# Patient Record
Sex: FEMALE | Race: WHITE | NOT HISPANIC OR LATINO | Employment: OTHER | URBAN - METROPOLITAN AREA
[De-identification: names, ages, dates, MRNs, and addresses within clinical notes are randomized per-mention and may not be internally consistent; named-entity substitution may affect disease eponyms.]

---

## 2019-02-13 ENCOUNTER — TRANSFERRED RECORDS (OUTPATIENT)
Dept: HEALTH INFORMATION MANAGEMENT | Facility: CLINIC | Age: 59
End: 2019-02-13

## 2020-08-10 ENCOUNTER — TRANSFERRED RECORDS (OUTPATIENT)
Dept: HEALTH INFORMATION MANAGEMENT | Facility: CLINIC | Age: 60
End: 2020-08-10

## 2020-10-01 ENCOUNTER — VIRTUAL VISIT (OUTPATIENT)
Dept: DERMATOLOGY | Facility: CLINIC | Age: 60
End: 2020-10-01
Payer: COMMERCIAL

## 2020-10-01 DIAGNOSIS — L81.9 DYSPIGMENTATION: Primary | ICD-10-CM

## 2020-10-01 PROCEDURE — 99207 PR NO CHARGE LOS: CPT | Mod: 95 | Performed by: DERMATOLOGY

## 2020-10-01 ASSESSMENT — PAIN SCALES - GENERAL: PAINLEVEL: NO PAIN (0)

## 2020-10-01 NOTE — PATIENT INSTRUCTIONS
[facility] Dermatology Visit    Thank you for allowing us to participate in your care. Your findings, instructions and follow-up plan are as follows:    In person visits to look at skin lightness distribution and use black light  Get records from [place]    When should I call my doctor?    If you are worsening or not improving, please, contact us or seek urgent care as noted below.     Who should I call with questions (adults)?    [facility] (adult and pediatric): [phone]     [facility] (adult): [phone]    For urgent needs outside of business hours call the [facility] [place] at [phone] and ask for the dermatology resident on call    If this is a medical emergency and you are unable to reach an ER, Call [phone]      Who should I call with questions (pediatric)?  [facility]- Pediatric Dermatology  Dr. Jesenia Christensen, Dr. Kassandra Valenzuela, Dr. Brook Heaton, Pema Suh, PA  Dr. Flores Caraballo, Dr. Breonna Agustin & Dr. Efren Watkins  Non Urgent  Nurse Triage Line; [phone]- Lissy and Rupinder RN Care Coordinators   Jannet (/Complex ) [phone]    If you need a prescription refill, please contact your pharmacy. Refills are approved or denied by our Physicians during normal business hours, Monday through Fridays  Per office policy, refills will not be granted if you have not been seen within the past year (or sooner depending on your child's condition)    Scheduling Information:  Pediatric Appointment Scheduling and Call Center [phone]  Radiology Scheduling- [phone]  Sedation Unit Scheduling- [phone]  [place] Scheduling- General [phone]; Pediatric Dermatology [phone]  Main  Services: [phone]  Iraqi: [phone]  [place] citizen: [phone]  Hmong/Khmer/Vietnamese: [phone]  Preadmission Nursing Department Fax Number: [phone] (Fax all pre-operative paperwork to this number)    For urgent matters arising during evenings, weekends, or holidays that cannot wait for normal business hours please call [phone] and ask for the Dermatology Resident On-Call to be paged.

## 2020-10-01 NOTE — Clinical Note
Michael barbour,  I need two things for this patient.  1. Call to [place] dermatologist and get records sent over  2. Schedule in person appointment so I can evaluate her skin with a black light for vitiligo (schedule 2 weeks or more so we can be sure I have records by then).    Thanks,  LF

## 2020-10-01 NOTE — PROGRESS NOTES
[facility] Dermatology Record:  Store and Forward and Telephone [phone]       Dermatology Problem List:  1. Dyspigmentation on the extremities  -Diagnosed as vitiligo at [place] dermatologist after biopsy was taken  -Plan: In person evaluation to use wood's lamp, records requested for outside provider    Encounter Date: Oct 1, 2020    CC:   Chief Complaint   Patient presents with     Derm Problem     vitiligo f/u      New Patient       History of Present Illness:  Amanda Turner is a 60 year old female who presents for light spots on the skin.    She notes these appeared suddenly on her shoulders, arms, and legs 4 months ago. She denies spots on the knuckles or extensor knees. She saw someone at [place] in Dermatology. First she had a virtual visit, then she had an in person visit where a single skin biopsy was taken. She was told the pathologist went back and forth but eventually stated biopsy was most consistent with vitiligo. She was treated with clobetasol cream which she thinks has helped after using in the last month.    Of note, Clari denies any skin rash proceeding light spots. She remembers that they seemed to occur after she uses an off brand spray sunscreen.     ROS: Patient is generally feeling well today    Physical Examination:  General: Well-appearing female, appropriately-developed individual.  Skin: Focused examination including one forearm, both anterior shins, one posterior shoulder was performed.   -Numerous solar lentigines intermixed with ill defined hypopigmented to depigmented patches with irregular borders. No follicular repigmentation within any.    Labs:  None    Past Medical History:     No past medical history on file.  Past Surgical History:   Procedure Laterality Date     COLONOSCOPY  1/13/2011    COLONOSCOPY performed by KAROLINE YODER at [facility]       Social History:  Patient reports that she has never smoked. She has never used smokeless tobacco.    Family History:  Not  assessed.    Medications:  Current Outpatient Medications   Medication     Calcium 600+D 600-200 MG-UNIT TABS     chlorthalidone (HYGROTEN) 25 MG tablet     Multiple Vitamins-Minerals (MULTI COMPLETE/IRON) TABS     No current facility-administered medications for this visit.           Allergies   Allergen Reactions     Codeine Shortness Of Breath     Nkda [No Known Drug Allergies]            Impression and Recommendations (Patient Counseled on the Following):  1. 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 The triggering with use of sunscreen favors PIH or less likely chemical vitiligo. I do not think vilitigo is most likely, but to rule out, would need to see in person and evaluate with a wood's lamp. Biopsies of vitiligo can be tricky and require normal skin biopsy for comparison, so I would not be surprised if a single skin biopsy was misdiagnosed as vitiligo without having the comparison. Explained this all to patient who not understanding.    I will not charge for this visit as I was unable to fully evaluate or offer full recommendations with a virtual visit.    Follow-up:   Next available in person visit     Staff only    Marguerite Oneal MD    Department of Dermatology  [facility] [place] [facility]: Phone: [phone], Fax:[phone]  [facility]: Phone: [phone], Fax: [phone]    _____________________________________________________________________________    Teledermatology information:  - Location of patient: [place]  - Patient presented as: self referral  - Location of teledermatologist:  ([facility] )  - Reason teledermatology is appropriate:  of National Emergency Regarding Coronavirus disease (COVID 19) Outbreak  - Image quality and interpretability: acceptable  - Physician has received verbal consent for a  "Video/Photos Visit from the patient? Yes  - In-person dermatology visit recommendation: yes - for physician visit  - Date of images: 9/27/20  - Service start time: 1:54  - Service end time: 2:05  - Date of report: 10/1/2020       Teledermatology Nurse Call for NEW Patients (not seen in last 3 years):     The patient was contacted by phone and we reviewed they have a visit in teledermatology upcoming with an MD or DANIEL;  Importantly, \"a teledermatology visit may not be as thorough as an in-person visit, and the quality of the photograph and/or video sent may not be of the same quality as that taken by the dermatology clinic.\"     This video visit will be conducted via a call between you and your physician/provider via [employer]. We have found that certain health care needs can be provided without the need for an in-person physical exam.  This service lets us provide the care you need with a video conversation.  If a prescription is necessary we can send it directly to your pharmacy.  If lab work is needed we can place an order for that and you can then stop by our lab to have the test done at a later time.If during the course of the call the physician/provider feels a video visit is not appropriate, you will not be charged for this service.Visits are billed at different rates depending on your insurance coverage. Please reach out to your insurance provider with any questions.    The patient will also send photographs via [employer] for review. Instructions for photography are/were sent to the patient via [employer] messaging.       The patient verified the location of the photo/video visit to be [place].(The physician must be notified by nurse if the patient is not in the state of [place] during the encounter)    The patient denied skin pain, fever, mucosal symptoms(lesions, blisters, sores in the mouth, nose, eyes, or genitals)  IF PATIENT ENDORSES ANY OF THESE STOP AND PAGE ON CALL ATTENDING    Additional notes:  Patient " summary of issue: Vitiligo   Location of problem on body: All over body   How long has area/symptoms been present: 4 months now   What makes it better?: N/A   What makes it worse?:N/A   Other symptoms include the following: No symptoms to the skin.   Which medications have been tried, for how long, and did they make it better or worse (ex. Triamcinolone, used twice daily for 2 weeks, not improved): Past tx's - Clobetasol Propionate 0.5% cream applying to arms and legs BID and then decreasing it to every other day for three weeks. She voices improvement comparing photos taken now compared to initial photos taken at the [facility].  No current tx's.   The patient has seen a dermatologist in [place].  The patient has no past medical history of skin cancer  ROS: The patient is generally feeling well.

## 2020-10-01 NOTE — LETTER
10/1/2020         RE: Amanda Turner  [address] [place] [address]        Dear Colleague,    Thank you for referring your patient, Amanda Turner, to the [facility]. Please see a copy of my visit note below.    [facility] Dermatology Record:  Store and Forward and Telephone [phone]       Dermatology Problem List:  1. Dyspigmentation on the extremities  -Diagnosed as vitiligo at [place] dermatologist after biopsy was taken  -Plan: In person evaluation to use wood's lamp, records requested for outside provider    Encounter Date: Oct 1, 2020    CC:   Chief Complaint   Patient presents with     Derm Problem     vitiligo f/u      New Patient       History of Present Illness:  Amanda Turner is a 60 year old female who presents for light spots on the skin.    She notes these appeared suddenly on her shoulders, arms, and legs 4 months ago. She denies spots on the knuckles or extensor knees. She saw someone at [place] in Dermatology. First she had a virtual visit, then she had an in person visit where a single skin biopsy was taken. She was told the pathologist went back and forth but eventually stated biopsy was most consistent with vitiligo. She was treated with clobetasol cream which she thinks has helped after using in the last month.    Of note, Clari denies any skin rash proceeding light spots. She remembers that they seemed to occur after she uses an off brand spray sunscreen.     ROS: Patient is generally feeling well today    Physical Examination:  General: Well-appearing female, appropriately-developed individual.  Skin: Focused examination including one forearm, both anterior shins, one posterior shoulder was performed.   -Numerous solar lentigines intermixed with ill defined hypopigmented to depigmented patches with irregular borders. No follicular repigmentation within any.    Labs:  None    Past Medical History:     No past medical history on file.  Past Surgical  History:   Procedure Laterality Date     COLONOSCOPY  1/13/2011    COLONOSCOPY performed by KAROLINE YODER at [facility]       Social History:  Patient reports that she has never smoked. She has never used smokeless tobacco.    Family History:  Not assessed.    Medications:  Current Outpatient Medications   Medication     Calcium 600+D 600-200 MG-UNIT TABS     chlorthalidone (HYGROTEN) 25 MG tablet     Multiple Vitamins-Minerals (MULTI COMPLETE/IRON) TABS     No current facility-administered medications for this visit.           Allergies   Allergen Reactions     Codeine Shortness Of Breath     Nkda [No Known Drug Allergies]            Impression and Recommendations (Patient Counseled on the Following):  1. 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 The triggering with use of sunscreen favors PIH or less likely chemical vitiligo. I do not think vilitigo is most likely, but to rule out, would need to see in person and evaluate with a wood's lamp. Biopsies of vitiligo can be tricky and require normal skin biopsy for comparison, so I would not be surprised if a single skin biopsy was misdiagnosed as vitiligo without having the comparison. Explained this all to patient who not understanding.    I will not charge for this visit as I was unable to fully evaluate or offer full recommendations with a virtual visit.    Follow-up:   Next available in person visit     Staff only    Marguerite Oneal MD    Department of Dermatology  [facility]: Phone: [phone], Fax:[phone]  [facility]: Phone: [phone], Fax: [phone]    _____________________________________________________________________________    Teledermatology information:  - Location of patient: [place]  - Patient presented as: self referral  - Location of teledermatologist:  ([facility] [place] [place] )  - Reason teledermatology is appropriate:  of National Emergency Regarding Coronavirus disease (COVID 19) Outbreak  - Image quality and interpretability: acceptable  - Physician has received verbal consent for a Video/Photos Visit from the patient? Yes  - In-person dermatology visit recommendation: yes - for physician visit  - Date of images: 9/27/20  - Service start time: 1:54  - Service end time: 2:05  - Date of report: 10/1/2020       Teledermatology Nurse Call for NEW Patients (not seen in last 3 years):     The patient was contacted by phone and we reviewed they have a visit in teledermatology upcoming with an MD or PAASHLEY;  Importantly, \"a teledermatology visit may not be as thorough as an in-person visit, and the quality of the photograph and/or video sent may not be of the same quality as that taken by the dermatology clinic.\"     This video visit will be conducted via a call between you and your physician/provider via [employer]. We have found that certain health care needs can be provided without the need for an in-person physical exam.  This service lets us provide the care you need with a video conversation.  If a prescription is necessary we can send it directly to your pharmacy.  If lab work is needed we can place an order for that and you can then stop by our lab to have the test done at a later time.If during the course of the call the physician/provider feels a video visit is not appropriate, you will not be charged for this service.Visits are billed at different rates depending on your insurance coverage. Please reach out to your insurance provider with any questions.    The patient will also send photographs via [employer] for review. Instructions for photography are/were sent to the patient via [employer] messaging.       The patient verified the location of the photo/video visit to be [place].(The physician must be notified by nurse if the patient is not in the state of [place] " during the encounter)    The patient denied skin pain, fever, mucosal symptoms(lesions, blisters, sores in the mouth, nose, eyes, or genitals)  IF PATIENT ENDORSES ANY OF THESE STOP AND PAGE ON CALL ATTENDING    Additional notes:  Patient summary of issue: Vitiligo   Location of problem on body: All over body   How long has area/symptoms been present: 4 months now   What makes it better?: N/A   What makes it worse?:N/A   Other symptoms include the following: No symptoms to the skin.   Which medications have been tried, for how long, and did they make it better or worse (ex. Triamcinolone, used twice daily for 2 weeks, not improved): Past tx's - Clobetasol Propionate 0.5% cream applying to arms and legs BID and then decreasing it to every other day for three weeks. She voices improvement comparing photos taken now compared to initial photos taken at the [facility].  No current tx's.   The patient has seen a dermatologist in [place].  The patient has no past medical history of skin cancer  ROS: The patient is generally feeling well.                         Again, thank you for allowing me to participate in the care of your patient.        Sincerely,        Marguerite Oneal MD

## 2020-10-02 ENCOUNTER — MYC MEDICAL ADVICE (OUTPATIENT)
Dept: DERMATOLOGY | Facility: CLINIC | Age: 60
End: 2020-10-02

## 2020-10-02 ENCOUNTER — TELEPHONE (OUTPATIENT)
Dept: DERMATOLOGY | Facility: CLINIC | Age: 60
End: 2020-10-02

## 2020-10-02 NOTE — TELEPHONE ENCOUNTER
Marguerite Oneal MD sent to [facility] Dermatology Adult [place]             Michael barbour,   I need two things for this patient.   1. Call to [place] dermatologist and get records sent over   2. Schedule in person appointment so I can evaluate her skin with a black light for vitiligo (schedule 2 weeks or more so we can be sure I have records by then).     Thanks,   LF      [email or web address] message sent to patient to confirm which dermatology clinic she was seen at.  Mackenzie Loving RN

## 2020-10-05 NOTE — TELEPHONE ENCOUNTER
I left a voicemail message at [facility] in [place] requesting the records be faxed to [phone].    Follow up appointment scheduled.  Patient notified via [employer].    Will monitor chart to ensure we receive records prior to appointment.  Mackenzie Loving RN

## 2020-10-29 ENCOUNTER — OFFICE VISIT (OUTPATIENT)
Dept: DERMATOLOGY | Facility: CLINIC | Age: 60
End: 2020-10-29
Payer: COMMERCIAL

## 2020-10-29 DIAGNOSIS — L80 VITILIGO: Primary | ICD-10-CM

## 2020-10-29 DIAGNOSIS — L82.1 SEBORRHEIC KERATOSIS: ICD-10-CM

## 2020-10-29 DIAGNOSIS — D18.01 CHERRY ANGIOMA: ICD-10-CM

## 2020-10-29 DIAGNOSIS — L81.4 SOLAR LENTIGO: ICD-10-CM

## 2020-10-29 DIAGNOSIS — L57.8 SUN-DAMAGED SKIN: ICD-10-CM

## 2020-10-29 DIAGNOSIS — D23.71 DERMATOFIBROMA OF RIGHT LOWER EXTREMITY: ICD-10-CM

## 2020-10-29 PROCEDURE — 99214 OFFICE O/P EST MOD 30 MIN: CPT | Performed by: DERMATOLOGY

## 2020-10-29 RX ORDER — CLOBETASOL PROPIONATE 0.5 MG/G
OINTMENT TOPICAL
Qty: 60 G | Refills: 1 | Status: CANCELLED | OUTPATIENT
Start: 2020-10-29

## 2020-10-29 RX ORDER — CLOBETASOL PROPIONATE 0.5 MG/G
OINTMENT TOPICAL
COMMUNITY
Start: 2020-07-11 | End: 2020-10-29 | Stop reason: ALTCHOICE

## 2020-10-29 RX ORDER — TACROLIMUS 1 MG/G
OINTMENT TOPICAL
Qty: 100 G | Refills: 11 | Status: SHIPPED | OUTPATIENT
Start: 2020-10-29 | End: 2021-01-26

## 2020-10-29 ASSESSMENT — PAIN SCALES - GENERAL: PAINLEVEL: NO PAIN (0)

## 2020-10-29 NOTE — PROGRESS NOTES
[facility] Dermatology Note    Dermatology Problem List:  1. Vitligo  -Diagnosed as vitiligo at [place] dermatologist after biopsy was taken (biopsy showed complete loss of melanocytes)  -Treated with clobetasol 0.05% ointment with subjective improvement per patient  -Current treatment: protopic 0.1% ointment BID  -TSH ordered  2. SKs  - SK on the left medial mid back, biopsy 2/13/19     Encounter Date: Oct 29, 2020    CC:  Chief Complaint   Patient presents with     Derm Problem     No personal hx of NMSC, unsure of family hx of NMSC, patient has had AKs removed, vitiligo - came about early this Spring, not immunosuppressed       History of Present Illness:  Ms. Amanda Turner is a 60 year old female who presents for dyspigmentation.    She was seen for a virtual visit on 10/1/20 and decision was made that in person visit was needed. Virtual visit was no charged. Lesions were not classic for vitiligo (which is what was diagnosed at previous dermatologist), and exam with a wood's lamp was indicated.     Since virtual visit, biopsy report was received. It showed complete loss of melanocytes consistent with vitiligo. Patient notes no specific concerns on her skin outside of loss of pigment on the arms and legs and back. She has not noticed elsewhere. She does think clobetasol helped. She has no history of autoimmune disease including thyroid disease.    No other concerns addressed today.    Past Medical History:   Patient Active Problem List   Diagnosis     Allergic rhinitis     Hemorrhoids     Vitiligo     History reviewed. No pertinent past medical history.  Past Surgical History:   Procedure Laterality Date     COLONOSCOPY  1/13/2011    COLONOSCOPY performed by KAROLINE YODER at [facility]       Social History:  Patient reports that she has never smoked. She has never used smokeless tobacco.Lives in [place], [place].    Family History:  History reviewed. No pertinent family  history.    Medications:  Current Outpatient Medications   Medication Sig Dispense Refill     Calcium 600+D 600-200 MG-UNIT TABS Take  by mouth.       chlorthalidone (HYGROTEN) 25 MG tablet Take 25 mg by mouth daily.       Multiple Vitamins-Minerals (MULTI COMPLETE/IRON) TABS Take  by mouth.       tacrolimus (PROTOPIC) 0.1 % external ointment Apply thin layer to vitiligo on arms and legs once to twice daily. 100 g 11     clobetasol (TEMOVATE) 0.05 % external ointment Apply a thin layer topically 2 times every day to the affected area(s)         Allergies   Allergen Reactions     Codeine Shortness Of Breath     Nkda [No Known Drug Allergies]        Review of Systems:  -Constitutional: Patient is otherwise feeling well, in usual state of health.   -Skin: As above in HPI. No additional skin concerns.    Physical exam:  Vitals: There were no vitals taken for this visit.  GEN: This is a well developed, well-nourished female in no acute distress, in a pleasant mood.    SKIN: Total skin excluding the undergarment areas was performed. The exam included the head/face, neck, both arms, chest, back, abdomen, both legs, digits and/or nails. Declined genital concerns.  - Stanley Type II  - Scattered brown macules on sun exposed areas.  - There is a large patch of vitiligo on the central upper back, scattered macules and patches of vitiligo along the forearms and bilateral shins. All these areas do fluoresce with wood's lamp examination. There is subtle vitiligo around the eyelids and mouth that is not clinically apparent and only seen with wood's lamp.  - There are dome shaped bright red papules on the trunk.   - Multiple regular brown pigmented macules and papules are identified on the body. About 50 nevi in all. None are atypical.  - There are waxy stuck on tan to brown papules on the left mid back.  - There is a firm tan/flesh colored papule that dimples with lateral pressure on the right distal shin.  - No other lesions  of concern on areas examined.     Impression/Plan:    1. Vitiligo: While not classic clinically, biopsy and wood's lamp exam confirms the diagnosis. Patient does note improvement with clobetasol but interestingly she has almost no follicular repigmentation at this time. She notes she prefers topicals, does not want light treatments at present. We will switch her to a nonsteroidal for safety - protopic 0.1% ointment prescribed to be applied once or twice daily. Will recheck in 3 months to see how she is doing. TBSE did not reveal any concerning lesions for melanoma induced vitiligo today. I have ordered a TSH due to increased thyroid disease seen in vitiligo patients. Patient will get this drawn next month when she goes for her physical.    2. Sun damaged skin with solar lentigines  - Recommend sunscreens SPF #30 or greater, protective clothing and avoidance of tanning beds.    3. Benign findings including: seborrheic keratoses, cherry angioma, dermatofibroma  - No further intervention required. Patient to report changes.   - Patient reassured of the benign nature of these lesions.    4. Multiple clinically benign nevi  - No further intervention required. Patient to report changes.   - Patient reassured of the benign nature of these lesions.    Follow-up in 3 months for check of vitiligo, earlier for new or changing lesions.     Staff Involved:  Staff only    Marguerite Oneal MD    Department of Dermatology  [facility]: Phone: [phone], Fax:[phone]  [facility]: Phone: [phone], Fax: [phone]

## 2020-10-29 NOTE — LETTER
10/29/2020         RE: Amanda Turner  [address] [facility] [address]        Dear Colleague,    Thank you for referring your patient, Amanda Turner, to the [facility]. Please see a copy of my visit note below.    [facility] Dermatology Note    Dermatology Problem List:  1. Vitligo  -Diagnosed as vitiligo at [place] dermatologist after biopsy was taken (biopsy showed complete loss of melanocytes)  -Treated with clobetasol 0.05% ointment with subjective improvement per patient  -Current treatment: protopic 0.1% ointment BID  -TSH ordered  2. SKs  - SK on the left medial mid back, biopsy 2/13/19     Encounter Date: Oct 29, 2020    CC:  Chief Complaint   Patient presents with     Derm Problem     No personal hx of NMSC, unsure of family hx of NMSC, patient has had AKs removed, vitiligo - came about early this Spring, not immunosuppressed       History of Present Illness:  Ms. Amanda Turner is a 60 year old female who presents for dyspigmentation.    She was seen for a virtual visit on 10/1/20 and decision was made that in person visit was needed. Virtual visit was no charged. Lesions were not classic for vitiligo (which is what was diagnosed at previous dermatologist), and exam with a wood's lamp was indicated.     Since virtual visit, biopsy report was received. It showed complete loss of melanocytes consistent with vitiligo. Patient notes no specific concerns on her skin outside of loss of pigment on the arms and legs and back. She has not noticed elsewhere. She does think clobetasol helped. She has no history of autoimmune disease including thyroid disease.    No other concerns addressed today.    Past Medical History:   Patient Active Problem List   Diagnosis     Allergic rhinitis     Hemorrhoids     Vitiligo     History reviewed. No pertinent past medical history.  Past Surgical History:   Procedure Laterality Date     COLONOSCOPY  1/13/2011     COLONOSCOPY performed by KAROLINE YODER at [facility]       Social History:  Patient reports that she has never smoked. She has never used smokeless tobacco.Lives in [place], [place].    Family History:  History reviewed. No pertinent family history.    Medications:  Current Outpatient Medications   Medication Sig Dispense Refill     Calcium 600+D 600-200 MG-UNIT TABS Take  by mouth.       chlorthalidone (HYGROTEN) 25 MG tablet Take 25 mg by mouth daily.       Multiple Vitamins-Minerals (MULTI COMPLETE/IRON) TABS Take  by mouth.       tacrolimus (PROTOPIC) 0.1 % external ointment Apply thin layer to vitiligo on arms and legs once to twice daily. 100 g 11     clobetasol (TEMOVATE) 0.05 % external ointment Apply a thin layer topically 2 times every day to the affected area(s)         Allergies   Allergen Reactions     Codeine Shortness Of Breath     Nkda [No Known Drug Allergies]        Review of Systems:  -Constitutional: Patient is otherwise feeling well, in usual state of health.   -Skin: As above in HPI. No additional skin concerns.    Physical exam:  Vitals: There were no vitals taken for this visit.  GEN: This is a well developed, well-nourished female in no acute distress, in a pleasant mood.    SKIN: Total skin excluding the undergarment areas was performed. The exam included the head/face, neck, both arms, chest, back, abdomen, both legs, digits and/or nails. Declined genital concerns.  - Stanley Type II  - Scattered brown macules on sun exposed areas.  - There is a large patch of vitiligo on the central upper back, scattered macules and patches of vitiligo along the forearms and bilateral shins. All these areas do fluoresce with wood's lamp examination. There is subtle vitiligo around the eyelids and mouth that is not clinically apparent and only seen with wood's lamp.  - There are dome shaped bright red papules on the trunk.   - Multiple regular brown pigmented macules and papules are identified on the  body. About 50 nevi in all. None are atypical.  - There are waxy stuck on tan to brown papules on the left mid back.  - There is a firm tan/flesh colored papule that dimples with lateral pressure on the right distal shin.  - No other lesions of concern on areas examined.     Impression/Plan:    1. Vitiligo: While not classic clinically, biopsy and wood's lamp exam confirms the diagnosis. Patient does note improvement with clobetasol but interestingly she has almost no follicular repigmentation at this time. She notes she prefers topicals, does not want light treatments at present. We will switch her to a nonsteroidal for safety - protopic 0.1% ointment prescribed to be applied once or twice daily. Will recheck in 3 months to see how she is doing. TBSE did not reveal any concerning lesions for melanoma induced vitiligo today. I have ordered a TSH due to increased thyroid disease seen in vitiligo patients. Patient will get this drawn next month when she goes for her physical.    2. Sun damaged skin with solar lentigines  - Recommend sunscreens SPF #30 or greater, protective clothing and avoidance of tanning beds.    3. Benign findings including: seborrheic keratoses, cherry angioma, dermatofibroma  - No further intervention required. Patient to report changes.   - Patient reassured of the benign nature of these lesions.    4. Multiple clinically benign nevi  - No further intervention required. Patient to report changes.   - Patient reassured of the benign nature of these lesions.    Follow-up in 3 months for check of vitiligo, earlier for new or changing lesions.     Staff Involved:  Staff only    Marguerite Oneal MD    Department of Dermatology  [facility]: Phone: [phone], Fax:[phone]  [facility]: Phone: [phone], Fax: [phone]              Again, thank you for allowing me to  participate in the care of your patient.        Sincerely,        Marguerite Oneal MD

## 2020-10-29 NOTE — NURSING NOTE
Amanda Turner's goals for this visit include:   Chief Complaint   Patient presents with     Derm Problem     No personal hx of NMSC, unsure of family hx of NMSC, patient has had AKs removed, vitiligo - came about early this Spring, not immunosuppressed     She requests these members of her care team be copied on today's visit information: No    PCP: No Ref-Primary, Physician    Referring Provider:  No referring provider defined for this encounter.    There were no vitals taken for this visit.    Do you need any medication refills at today's visit? No    Billie Valles LPN

## 2020-11-16 ENCOUNTER — HEALTH MAINTENANCE LETTER (OUTPATIENT)
Age: 60
End: 2020-11-16

## 2021-01-26 ENCOUNTER — OFFICE VISIT (OUTPATIENT)
Dept: DERMATOLOGY | Facility: CLINIC | Age: 61
End: 2021-01-26
Payer: COMMERCIAL

## 2021-01-26 DIAGNOSIS — L80 VITILIGO: Primary | ICD-10-CM

## 2021-01-26 PROCEDURE — 99213 OFFICE O/P EST LOW 20 MIN: CPT | Performed by: DERMATOLOGY

## 2021-01-26 ASSESSMENT — PAIN SCALES - GENERAL: PAINLEVEL: NO PAIN (0)

## 2021-01-26 NOTE — NURSING NOTE
Amanda Turner's goals for this visit include:   Chief Complaint   Patient presents with     Follow Up     for vitiligo. Is using protopic. Overall, she feels that vitiligo is improving.      She requests these members of her care team be copied on today's visit information:     PCP: No Ref-Primary, Physician    Referring Provider:  No referring provider defined for this encounter.    There were no vitals taken for this visit.    Do you need any medication refills at today's visit? No    Tabatha Henderson LPN

## 2021-01-26 NOTE — PROGRESS NOTES
[facility] Dermatology Note  Encounter Date: Jan 26, 2021  Office Visit     Dermatology Problem List:  Skin c nestor completed 10/29/20  1. Vitligo  -Diagnosed as vitiligo at [place] dermatologist after biopsy was taken (biopsy showed complete loss of melanocytes)  -Treated with clobetasol 0.05% ointment with subjective improvement per patient but no follicular repigmentation  -Previous txt: protopic 0.1% ointment BID (no change)  -No current treatment  2. SKs  - SK on the left medial mid back, biopsy 2/13/19     Family History: Negative for skin cancer.  Social History: Lives in [place], [place].  ____________________________________________    ASSESSMENT/PLAN:  # Vitiligo: Chronic, stable. No improvement on topicals.  While not classic clinically, biopsy and wood's lamp exam confirms the diagnosis. Previous subjective improvement with clobetasol but interestingly did not show follicular repigmentation. Switched at last visit to protopic 0.1% ointment for long term safety. Today, no follicular repigmentation, but significant cosmetic improvement due to no tan on the skin blending loss of pigment. At this time, I think best cosmetic outcome will be with diligent photoprotection. We did discuss increased risk of skin cancer and need for sun protective clothing and sunscreen to counteract this. She noted understanding. We briefly discussed using narrow band UVB, but I think this will accentuate areas of remaining pigment and make differences more apparent. Since vitiligo is not clinically apparent, I believe no treatment is the best option.  - Hold protopic 0.1% ointment, due to minimal improvement since previous visit.  - Advised diligent sun protection  - Patient reports normal TSH - Showed result on phone of 1.58 - Recommended checking TSH yearly in the future.    Procedures Performed:   None.    Follow-up: prn for new or changing lesions    Staff and Scribe:     Scribe Disclosure:   I,  Marcelo Caban, am serving as a scribe to document services personally performed by this physician, Dr. Marguerite Oneal, based on data collection and the provider's statements to me.     Provider Disclosure:   The documentation recorded by the scribe accurately reflects the services I personally performed and the decisions made by me.    Marguerite Oneal MD    Department of Dermatology  [facility]: Phone: [phone], Fax:[phone]  [facility]: Phone: [phone], Fax: [phone]    ____________________________________________    CC: Follow Up (for vitiligo. Is using protopic. Overall, she feels that vitiligo is improving. )    HPI:  Ms. Amanda Turner is a(n) 60 year old female who presents today as a return patient for vitiligo.    The patient was last seen 10/19/20 for skin check and vitiligo. At that time, no concerning lesions were identified for melanoma. Patient was switched to protopic 0.1% ointment. TSH ordered - completed outside our system and was wnl.    Today, she believes she has improved using protopic, but notes it is difficult to tell. She has applied protopic to the arms, legs and shoulders. She has been using it once a day. She is wondering if pigment is starting to return. She reports she had her TSH checked, which was normal. Otherwise denies any tender, nonhealing, or bleeding skin lesions.    Patient is otherwise feeling well, without additional concerns.    Labs:  NA    Physical exam:  Vitals: There were no vitals taken for this visit.  SKIN: Focused examination of the back, forearms, legs and shoulders was performed.  - Stanley Type II  - There is a large patch of vitiligo on the central upper back extending to the shoulders, scattered macules and patches of vitiligo along the forearms and bilateral shins. No follicular repigmentation in any areas. All  these areas do fluoresce with wood's lamp examination. There is subtle vitiligo around the eyelids and mouth that is not clinically apparent and only seen with wood's lamp.  - No other lesions of concern on areas examined.     Medications:  Current Outpatient Medications   Medication     Calcium 600+D 600-200 MG-UNIT TABS     chlorthalidone (HYGROTEN) 25 MG tablet     Multiple Vitamins-Minerals (MULTI COMPLETE/IRON) TABS     tacrolimus (PROTOPIC) 0.1 % external ointment     No current facility-administered medications for this visit.       Past Medical History:   Patient Active Problem List   Diagnosis     Allergic rhinitis     Hemorrhoids     Vitiligo     No past medical history on file.     CC No referring provider defined for this encounter. on close of this encounter.

## 2021-01-26 NOTE — LETTER
1/26/2021         RE: Amanda Turner  [address] [facility] [address]        Dear Colleague,    Thank you for referring your patient, Amanda Turner, to the [facility]. Please see a copy of my visit note below.    [facility] Dermatology Note  Encounter Date: Jan 26, 2021  Office Visit     Dermatology Problem List:  Skin c heck completed 10/29/20  1. Vitligo  -Diagnosed as vitiligo at [place] dermatologist after biopsy was taken (biopsy showed complete loss of melanocytes)  -Treated with clobetasol 0.05% ointment with subjective improvement per patient but no follicular repigmentation  -Previous txt: protopic 0.1% ointment BID (no change)  -No current treatment  2. SKs  - SK on the left medial mid back, biopsy 2/13/19     Family History: Negative for skin cancer.  Social History: Lives in [place], [place].  ____________________________________________    ASSESSMENT/PLAN:  # Vitiligo: Chronic, stable. No improvement on topicals.  While not classic clinically, biopsy and wood's lamp exam confirms the diagnosis. Previous subjective improvement with clobetasol but interestingly did not show follicular repigmentation. Switched at last visit to protopic 0.1% ointment for long term safety. Today, no follicular repigmentation, but significant cosmetic improvement due to no tan on the skin blending loss of pigment. At this time, I think best cosmetic outcome will be with diligent photoprotection. We did discuss increased risk of skin cancer and need for sun protective clothing and sunscreen to counteract this. She noted understanding. We briefly discussed using narrow band UVB, but I think this will accentuate areas of remaining pigment and make differences more apparent. Since vitiligo is not clinically apparent, I believe no treatment is the best option.  - Hold protopic 0.1% ointment, due to minimal improvement since previous visit.  - Advised diligent sun  protection  - Patient reports normal TSH - Showed result on phone of 1.58 - Recommended checking TSH yearly in the future.    Procedures Performed:   None.    Follow-up: prn for new or changing lesions    Staff and Scribe:     Scribe Disclosure:   I, Marcelo Caban, am serving as a scribe to document services personally performed by this physician, Dr. Marguerite Oneal, based on data collection and the provider's statements to me.     Provider Disclosure:   The documentation recorded by the scribe accurately reflects the services I personally performed and the decisions made by me.    Marguerite Oneal MD    Department of Dermatology  [facility]: Phone: [phone], Fax:[phone]  [facility]: Phone: [phone], Fax: [phone]    ____________________________________________    CC: Follow Up (for vitiligo. Is using protopic. Overall, she feels that vitiligo is improving. )    HPI:  Ms. Amanda Turner is a(n) 60 year old female who presents today as a return patient for vitiligo.    The patient was last seen 10/19/20 for skin check and vitiligo. At that time, no concerning lesions were identified for melanoma. Patient was switched to protopic 0.1% ointment. TSH ordered - completed outside our system and was wnl.    Today, she believes she has improved using protopic, but notes it is difficult to tell. She has applied protopic to the arms, legs and shoulders. She has been using it once a day. She is wondering if pigment is starting to return. She reports she had her TSH checked, which was normal. Otherwise denies any tender, nonhealing, or bleeding skin lesions.    Patient is otherwise feeling well, without additional concerns.    Labs:  NA    Physical exam:  Vitals: There were no vitals taken for this visit.  SKIN: Focused examination of the back, forearms, legs and shoulders was  performed.  - Stanley Type II  - There is a large patch of vitiligo on the central upper back extending to the shoulders, scattered macules and patches of vitiligo along the forearms and bilateral shins. No follicular repigmentation in any areas. All these areas do fluoresce with wood's lamp examination. There is subtle vitiligo around the eyelids and mouth that is not clinically apparent and only seen with wood's lamp.  - No other lesions of concern on areas examined.     Medications:  Current Outpatient Medications   Medication     Calcium 600+D 600-200 MG-UNIT TABS     chlorthalidone (HYGROTEN) 25 MG tablet     Multiple Vitamins-Minerals (MULTI COMPLETE/IRON) TABS     tacrolimus (PROTOPIC) 0.1 % external ointment     No current facility-administered medications for this visit.       Past Medical History:   Patient Active Problem List   Diagnosis     Allergic rhinitis     Hemorrhoids     Vitiligo     No past medical history on file.     CC No referring provider defined for this encounter. on close of this encounter.      Again, thank you for allowing me to participate in the care of your patient.        Sincerely,        Marguerite Oneal MD

## 2021-09-18 ENCOUNTER — HEALTH MAINTENANCE LETTER (OUTPATIENT)
Age: 61
End: 2021-09-18

## 2022-01-08 ENCOUNTER — HEALTH MAINTENANCE LETTER (OUTPATIENT)
Age: 62
End: 2022-01-08

## 2022-11-19 ENCOUNTER — HEALTH MAINTENANCE LETTER (OUTPATIENT)
Age: 62
End: 2022-11-19

## 2022-11-26 ENCOUNTER — HOSPITAL ENCOUNTER (EMERGENCY)
Facility: CLINIC | Age: 62
Discharge: HOME | End: 2022-11-27
Attending: EMERGENCY MEDICINE | Admitting: EMERGENCY MEDICINE
Payer: COMMERCIAL

## 2022-11-26 ENCOUNTER — APPOINTMENT (OUTPATIENT)
Dept: GENERAL RADIOLOGY | Facility: CLINIC | Age: 62
End: 2022-11-26
Attending: EMERGENCY MEDICINE
Payer: COMMERCIAL

## 2022-11-26 ENCOUNTER — ANESTHESIA (OUTPATIENT)
Dept: EMERGENCY MEDICINE | Facility: CLINIC | Age: 62
End: 2022-11-26
Payer: COMMERCIAL

## 2022-11-26 ENCOUNTER — ANESTHESIA EVENT (OUTPATIENT)
Dept: EMERGENCY MEDICINE | Facility: CLINIC | Age: 62
End: 2022-11-26
Payer: COMMERCIAL
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
## 2022-11-26 DIAGNOSIS — S43.004A SHOULDER DISLOCATION, RIGHT, INITIAL ENCOUNTER: ICD-10-CM

## 2022-11-26 DIAGNOSIS — S09.90XA CLOSED HEAD INJURY WITHOUT LOSS OF CONSCIOUSNESS, INITIAL ENCOUNTER: ICD-10-CM

## 2022-11-26 DIAGNOSIS — S70.01XA CONTUSION OF RIGHT HIP, INITIAL ENCOUNTER: ICD-10-CM

## 2022-11-26 LAB
HOLD SPECIMEN: NORMAL

## 2022-11-26 PROCEDURE — 23650 CLTX SHO DSLC W/MNPJ WO ANES: CPT | Mod: RT | Performed by: EMERGENCY MEDICINE

## 2022-11-26 PROCEDURE — 370N000003 HC ANESTHESIA WARD SERVICE

## 2022-11-26 PROCEDURE — 99285 EMERGENCY DEPT VISIT HI MDM: CPT | Mod: 25 | Performed by: EMERGENCY MEDICINE

## 2022-11-26 PROCEDURE — 73060 X-RAY EXAM OF HUMERUS: CPT | Mod: RT

## 2022-11-26 PROCEDURE — 999N000065 XR SHOULDER RIGHT PORT G/E 2 VIEWS: Mod: RT

## 2022-11-26 PROCEDURE — 73090 X-RAY EXAM OF FOREARM: CPT | Mod: RT

## 2022-11-26 PROCEDURE — 250N000011 HC RX IP 250 OP 636: Performed by: EMERGENCY MEDICINE

## 2022-11-26 PROCEDURE — 96374 THER/PROPH/DIAG INJ IV PUSH: CPT | Performed by: EMERGENCY MEDICINE

## 2022-11-26 PROCEDURE — 73502 X-RAY EXAM HIP UNI 2-3 VIEWS: CPT

## 2022-11-26 PROCEDURE — 250N000011 HC RX IP 250 OP 636: Performed by: NURSE ANESTHETIST, CERTIFIED REGISTERED

## 2022-11-26 PROCEDURE — 250N000009 HC RX 250: Performed by: NURSE ANESTHETIST, CERTIFIED REGISTERED

## 2022-11-26 RX ORDER — GLYCOPYRROLATE 0.2 MG/ML
INJECTION, SOLUTION INTRAMUSCULAR; INTRAVENOUS PRN
Status: DISCONTINUED | OUTPATIENT
Start: 2022-11-26 | End: 2022-11-26

## 2022-11-26 RX ORDER — PROPOFOL 10 MG/ML
INJECTION, EMULSION INTRAVENOUS PRN
Status: DISCONTINUED | OUTPATIENT
Start: 2022-11-26 | End: 2022-11-26

## 2022-11-26 RX ORDER — HYDROMORPHONE HYDROCHLORIDE 1 MG/ML
0.5 INJECTION, SOLUTION INTRAMUSCULAR; INTRAVENOUS; SUBCUTANEOUS ONCE
Status: COMPLETED | OUTPATIENT
Start: 2022-11-26 | End: 2022-11-26

## 2022-11-26 RX ADMIN — PROPOFOL 50 MG: 10 INJECTION, EMULSION INTRAVENOUS at 23:06

## 2022-11-26 RX ADMIN — HYDROMORPHONE HYDROCHLORIDE 0.5 MG: 1 INJECTION, SOLUTION INTRAMUSCULAR; INTRAVENOUS; SUBCUTANEOUS at 21:11

## 2022-11-26 RX ADMIN — PROPOFOL 50 MG: 10 INJECTION, EMULSION INTRAVENOUS at 23:20

## 2022-11-26 RX ADMIN — PROPOFOL 50 MG: 10 INJECTION, EMULSION INTRAVENOUS at 23:10

## 2022-11-26 RX ADMIN — PROPOFOL 50 MG: 10 INJECTION, EMULSION INTRAVENOUS at 23:08

## 2022-11-26 RX ADMIN — GLYCOPYRROLATE 0.3 MG: 0.2 INJECTION, SOLUTION INTRAMUSCULAR; INTRAVENOUS at 23:04

## 2022-11-26 RX ADMIN — PROPOFOL 50 MG: 10 INJECTION, EMULSION INTRAVENOUS at 23:04

## 2022-11-26 ASSESSMENT — ACTIVITIES OF DAILY LIVING (ADL)
ADLS_ACUITY_SCORE: 35
ADLS_ACUITY_SCORE: 35

## 2022-11-27 VITALS
HEART RATE: 77 BPM | BODY MASS INDEX: 29.82 KG/M2 | OXYGEN SATURATION: 96 % | SYSTOLIC BLOOD PRESSURE: 151 MMHG | WEIGHT: 190 LBS | DIASTOLIC BLOOD PRESSURE: 113 MMHG | HEIGHT: 67 IN | RESPIRATION RATE: 22 BRPM

## 2022-11-27 PROCEDURE — 250N000013 HC RX MED GY IP 250 OP 250 PS 637: Performed by: EMERGENCY MEDICINE

## 2022-11-27 RX ORDER — OXYCODONE HYDROCHLORIDE 5 MG/1
5-10 TABLET ORAL EVERY 6 HOURS PRN
Qty: 15 TABLET | Refills: 0 | Status: SHIPPED | OUTPATIENT
Start: 2022-11-27 | End: 2022-11-30

## 2022-11-27 RX ORDER — OXYCODONE AND ACETAMINOPHEN 5; 325 MG/1; MG/1
2 TABLET ORAL ONCE
Status: COMPLETED | OUTPATIENT
Start: 2022-11-27 | End: 2022-11-27

## 2022-11-27 RX ORDER — OXYCODONE HYDROCHLORIDE 5 MG/1
5-10 TABLET ORAL EVERY 6 HOURS PRN
Qty: 10 TABLET | Refills: 0 | Status: SHIPPED | OUTPATIENT
Start: 2022-11-27

## 2022-11-27 RX ADMIN — OXYCODONE HYDROCHLORIDE AND ACETAMINOPHEN 2 TABLET: 5; 325 TABLET ORAL at 00:46

## 2022-11-27 NOTE — ANESTHESIA PREPROCEDURE EVALUATION
Anesthesia Pre-Procedure Evaluation    Patient: Dacia Turner   MRN: [id number] : 1960        Procedure :           History reviewed. No pertinent past medical history.   Past Surgical History:   Procedure Laterality Date     COLONOSCOPY  2011    COLONOSCOPY performed by KAROLINE YODER at [facility]      Allergies   Allergen Reactions     Codeine Shortness Of Breath     Nkda [No Known Drug Allergies]       Social History     Tobacco Use     Smoking status: Never     Smokeless tobacco: Never   Substance Use Topics     Alcohol use: Not on file      Wt Readings from Last 1 Encounters:   22 86.2 kg (190 lb)        Anesthesia Evaluation            ROS/MED HX  ENT/Pulmonary:     (+) allergic rhinitis,     Neurologic:       Cardiovascular:       METS/Exercise Tolerance:     Hematologic:       Musculoskeletal:       GI/Hepatic:       Renal/Genitourinary:       Endo:       Psychiatric/Substance Use:       Infectious Disease:       Malignancy:       Other:               OUTSIDE LABS:  CBC: No results found for: WBC, HGB, HCT, PLT  BMP: No results found for: NA, POTASSIUM, CHLORIDE, CO2, BUN, CR, GLC  COAGS: No results found for: PTT, INR, FIBR  POC: No results found for: BGM, HCG, HCGS  HEPATIC: No results found for: ALBUMIN, PROTTOTAL, ALT, AST, GGT, ALKPHOS, BILITOTAL, BILIDIRECT, TISH  OTHER: No results found for: PH, LACT, A1C, AUGUSTO, PHOS, MAG, LIPASE, AMYLASE, TSH, T4, T3, CRP, SED    Anesthesia Plan    ASA Status:  3, emergent    NPO Status:  NPO Appropriate    Anesthesia Type: General.      Maintenance: Balanced.        Consents    Anesthesia Plan(s) and associated risks, benefits, and realistic alternatives discussed. Questions answered and patient/representative(s) expressed understanding.    - Discussed:     - Discussed with:  Patient         Postoperative Care            Comments:                Julieth Solorio, CRNA, APRN CRNA

## 2022-11-27 NOTE — DISCHARGE INSTRUCTIONS
Follow-up in orthopedics clinic this coming week in your hometown of [facility] when you return home.      Use one or more over the counter meds as needed to prevent constipation from opiate analgesic or Percocet/Oxycodone:     Fiber supplement  Milk of Magnesia  Miralax powder daily (or generic brand)  Dulcolax or Senokot (laxative)  Fleet Enema if needed for severe constipation    These are available over-the-counter and can be used together or in combination, as needed to prevent constipation.

## 2022-11-27 NOTE — ED TRIAGE NOTES
"Pt states she slipped on the ice and hit her \"whole R side from hip up\".  Pt reports hitting her head, no LOC.  Pt not on blood thinners.  Pt unable to move her shoulder 2/2 pain.  Good pulse, hands and finger move and are warm.   No Neck pain, no mid line tenderness to palpate.  Pt able to walk and bear weight on R hip, just painful.      "

## 2022-11-27 NOTE — ED PROVIDER NOTES
"  History     Chief Complaint   Patient presents with     Fall     Shoulder Injury     Hip Pain     Head Injury     HPI  Dacia Turner is a 62 year old female from [facility] (visiting her mother) who presents with her  for evaluation of injuries from slipping and falling on [address] shortly prior to arrival.  She fell onto her right side, onto her right shoulder and right hip area.  She had a minor posterior occipital head injury without LOC. No HA, N/V or neck pain or injury or back pain or injury. Primary injury is the right shoulder injury. No neurovascular or CMS compromise other than limited range of motion of the right shoulder secondary to pain. No anticoagulation therapy.    Allergies:  Allergies   Allergen Reactions     Codeine Shortness Of Breath     Nkda [No Known Drug Allergies]        Problem List:    Patient Active Problem List    Diagnosis Date Noted     Vitiligo 10/29/2020     Priority: Medium     Allergic rhinitis 04/30/2007     Priority: Medium     Hemorrhoids 04/30/2007     Priority: Medium        Past Medical History:    History reviewed. No pertinent past medical history.    Past Surgical History:    Past Surgical History:   Procedure Laterality Date     COLONOSCOPY  1/13/2011    COLONOSCOPY performed by KAROLINE YODER at [facility]       Family History:    History reviewed. No pertinent family history.    Social History:  Marital Status:   [2]  Social History     Tobacco Use     Smoking status: Never     Smokeless tobacco: Never        Medications:    oxyCODONE (ROXICODONE) 5 MG tablet  Calcium 600+D 600-200 MG-UNIT TABS  chlorthalidone (HYGROTEN) 25 MG tablet  Multiple Vitamins-Minerals (MULTI COMPLETE/IRON) TABS        Review of Systems  As mentioned above in the history present illness.  All other systems were reviewed and are negative.    Physical Exam   BP: (!) 160/95  Pulse: 68  Resp: 20  Height: 170.2 cm (5' 7\")  Weight: 86.2 kg (190 lb)  SpO2: 96 " %      Physical Exam  Vitals and nursing note reviewed.   Constitutional:       General: She is not in acute distress.     Appearance: Normal appearance. She is well-developed. She is not ill-appearing or diaphoretic.   HENT:      Head: Normocephalic and atraumatic. No raccoon eyes, Avery's sign, abrasion or contusion.      Right Ear: Tympanic membrane, ear canal and external ear normal. No drainage. No hemotympanum.      Left Ear: Tympanic membrane, ear canal and external ear normal. No drainage. No hemotympanum.      Nose: Nose normal. No rhinorrhea.      Mouth/Throat:      Mouth: Mucous membranes are moist.   Eyes:      General: No scleral icterus.     Extraocular Movements: Extraocular movements intact.      Conjunctiva/sclera: Conjunctivae normal.   Neck:      Trachea: No tracheal deviation.   Cardiovascular:      Rate and Rhythm: Normal rate and regular rhythm.      Heart sounds: Normal heart sounds. No murmur heard.    No friction rub. No gallop.   Pulmonary:      Effort: Pulmonary effort is normal. No respiratory distress.      Breath sounds: Normal breath sounds. No wheezing, rhonchi or rales.   Abdominal:      General: There is no distension.      Palpations: Abdomen is soft.      Tenderness: There is no abdominal tenderness.   Musculoskeletal:      Right shoulder: Deformity (anterior shoulder dislocation), tenderness and bony tenderness present. No swelling or crepitus. Decreased range of motion. Normal strength. Normal pulse.      Left shoulder: Normal.      Cervical back: Normal, full passive range of motion without pain, normal range of motion and neck supple. No signs of trauma or bony tenderness. No pain with movement, spinous process tenderness or muscular tenderness.      Thoracic back: Normal. No signs of trauma, tenderness or bony tenderness.      Lumbar back: Normal. No signs of trauma, tenderness or bony tenderness.      Right hip: Tenderness ( ST tenderness with no contusion or abrasion)  present. No deformity, bony tenderness or crepitus. Normal range of motion.      Left hip: Normal. No tenderness or bony tenderness.      Right lower leg: No edema.      Left lower leg: No edema.        Legs:    Skin:     General: Skin is warm and dry.      Coloration: Skin is not pale.      Findings: No erythema or rash.   Neurological:      General: No focal deficit present.      Mental Status: She is alert and oriented to person, place, and time.      GCS: GCS eye subscore is 4. GCS verbal subscore is 5. GCS motor subscore is 6.      Cranial Nerves: Cranial nerves 2-12 are intact.      Sensory: Sensation is intact.      Motor: Motor function is intact.      Coordination: Coordination is intact. Coordination normal.      Gait: Gait is intact.   Psychiatric:         Mood and Affect: Mood normal.         Behavior: Behavior normal.         ED Course             [facility]    -Dislocation - Upper Extremity    Date/Time: 11/26/2022 11:30 PM  Performed by: Julius Benavidez MD  Authorized by: Julius Benavidez MD     Risks, benefits and alternatives discussed.      LOCATION     Location:  Shoulder    Shoulder location:  R shoulder    Shoulder dislocation type: anterior      Chronicity:  New    PRE PROCEDURE ASSESSMENT      Pre-procedure imaging:  X-ray    Imaging findings: dislocation present      Imaging findings: no fracture      Fracture of greater humeral tuberosity: no      Hill-Sachs deformity: no      Distal perfusion: normal      ANESTHESIA (see MAR for exact dosages)      Anesthesia method: Procedural sedation performed by the anesthesia service/CRNA on-call.    PROCEDURE DETAILS      Manipulation performed: yes      Shoulder reduction method:  External rotation and direct traction    Reduction successful: yes      Reduction confirmed with imaging: yes      Supplies used: Shoulder immobilizer.    POST PROCEDURE DETAILS     Neurological function: normal      Distal perfusion: normal       Range of motion: improved        PROCEDURE    Patient Tolerance:  Patient tolerated the procedure well with no immediate complications                Results for orders placed or performed during the hospital encounter of 11/26/22 (from the past 24 hour(s))   [place] Draw    Narrative    The following orders were created for panel order [place] Draw.  Procedure                               Abnormality         Status                     ---------                               -----------         ------                     Extra Blue Top Tube[[id number]]                              Final result               Extra Red Top Tube[[id number]]                               Final result               Extra Green Top (Lithium...[[id number]]                      Final result               Extra Purple Top Tube[[id number]]                            Final result                 Please view results for these tests on the individual orders.   Extra Blue Top Tube   Result Value Ref Range    Hold Specimen JIC    Extra Red Top Tube   Result Value Ref Range    Hold Specimen JIC    Extra Green Top (Lithium Heparin) Tube   Result Value Ref Range    Hold Specimen JIC    Extra Purple Top Tube   Result Value Ref Range    Hold Specimen JIC    Pelvis XR w/ unilateral hip right    Narrative    EXAM: XR PELVIS AND HIP RIGHT 1 VIEW  LOCATION: [facility]  DATE/TIME: 11/26/2022 10:20 PM    INDICATION: Fall, trauma, right hip area pain.  COMPARISON: None.      Impression    IMPRESSION: Degenerative changes lower lumbar spine and joints of the pelvis including the right hip joint. No fracture, dislocation or x-ray evidence of avascular necrosis.    XR Humerus Right G/E 2 Views    Narrative    EXAM: XR HUMERUS RIGHT G/E 2 VIEWS  LOCATION: [facility]  DATE/TIME: 11/26/2022 10:21 PM    INDICATION: Fall, trauma, pain  COMPARISON: None.      Impression    IMPRESSION: Right glenohumeral  dislocation, likely anterior and inferior. No fracture.   XR Forearm Right 2 Views    Narrative    EXAM: XR FOREARM RIGHT 2 VIEWS  LOCATION: [facility]  DATE/TIME: 11/26/2022 10:22 PM    INDICATION: Fall, trauma, pain.  COMPARISON: X-ray right humerus 2 views 11/26/2022 at 2207 hours.      Impression    IMPRESSION: No acute fracture or dislocation involving the right radius or ulna. Mild degenerative changes right elbow and wrist joints. Mild soft tissue swelling posteriorly.    XR Shoulder Right Port G/E 2 Views    Narrative    EXAM: XR SHOULDER RIGHT PORT G/E 2 VIEWS  LOCATION: [facility]  DATE/TIME: 11/26/2022 11:33 PM    INDICATION: Right shoulder reduction following dislocation.  COMPARISON: 11/26/2022 at 10:07 PM      Impression    IMPRESSION: Interval reduction of the prior dislocation of the right glenohumeral joint. There is now normal alignment of the humeral head with the glenoid cavity. 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 No definitive evidence for acute   fracture. Right acromioclavicular joint intact with minor degenerative changes.       Medications   oxyCODONE-acetaminophen (PERCOCET) 5-325 MG per tablet 2 tablet (has no administration in time range)   HYDROmorphone (PF) (DILAUDID) injection 0.5 mg (0.5 mg Intravenous Given 11/26/22 2111)       Assessments & Plan (with Medical Decision Making)   62 year old female from [facility] who slipped on [employer] and fell onto her right side, onto her right shoulder and right hip area.  She had a minor posterior occipital head injury without LOC. Neurologically intact. She has a right shoulder anterior dislocation with intact RUE neurovascular function which was reduced under procedural sedation provided by the anesthesia service/CRNA on-call.  Neurovascular function after reduction was intact. She was placed in a shoulder immobilizer and discharged with instructions for  supportive care and copies of her x-rays on disc for follow-up with orthopedics in her hometown of [facility].  Right hip contusion appears to be a benign soft tissue nature and pelvis and right hip films are negative.  She is able to ambulate independently and did not need crutches.  Oxycodone was prescribed for pain refractory to NSAID.  She and her  were discharged with head injury instructions and instructions for supportive care.    I have reviewed the nursing notes.    I have reviewed the findings, diagnosis, plan and need for follow up with the patient and her .    New Prescriptions    OXYCODONE (ROXICODONE) 5 MG TABLET    Take 1-2 tablets (5-10 mg) by mouth every 6 hours as needed for severe pain (7-10)       Final diagnoses:   Fall due to slipping on ice or snow, initial encounter   Shoulder dislocation, right, initial encounter - Closed, anterior, inferior   Closed head injury without loss of consciousness, initial encounter   Contusion of right hip, initial encounter       11/26/2022   [facility] EMERGENCY DEPT            Julius Benavidez MD  11/29/22 8182

## 2022-11-27 NOTE — ANESTHESIA CARE TRANSFER NOTE
Patient: Dacia Turner    Procedure: * No procedures listed *       Diagnosis: * No pre-op diagnosis entered *  Diagnosis Additional Information: No value filed.    Anesthesia Type:   General     Note:    Oropharynx: oropharynx clear of all foreign objects and spontaneously breathing  Level of Consciousness: awake and drowsy  Oxygen Supplementation: face mask  Level of Supplemental Oxygen (L/min / FiO2): 8  Independent Airway: airway patency satisfactory and stable  Dentition: dentition unchanged  Vital Signs Stable: post-procedure vital signs reviewed and stable  Report to RN Given: handoff report given  Patient transferred to: Emergency Department    Handoff Report: Identifed the Patient, Identified the Reponsible Provider, Reviewed the pertinent medical history, Discussed the surgical course, Reviewed Intra-OP anesthesia mangement and issues during anesthesia, Set expectations for post-procedure period and Allowed opportunity for questions and acknowledgement of understanding      Vitals:  Vitals Value Taken Time   /94 11/26/22 2330   Temp     Pulse 78 11/26/22 2335   Resp 23 11/26/22 2335   SpO2 96 % 11/26/22 2335   Vitals shown include unvalidated device data.    Electronically Signed By: Julieth Solorio CRNA, MARINA BRAXTON  November 26, 2022  11:36 PM

## 2022-11-27 NOTE — ED NOTES
Misaligned right shoulder- supported with pillow, ice for pain.  Good CMS, pulses palpable. Pain in right hip, good cms, pedal pulses present and palpable.

## 2022-11-27 NOTE — ANESTHESIA POSTPROCEDURE EVALUATION
Patient: Dacia Turner    Procedure: * No procedures listed *       Anesthesia Type:  General    Note:  Disposition: Outpatient   Postop Pain Control: Uneventful            Sign Out: Well controlled pain   PONV: No   Neuro/Psych: Uneventful            Sign Out: Acceptable/Baseline neuro status   Airway/Respiratory: Uneventful            Sign Out: Acceptable/Baseline resp. status   CV/Hemodynamics: Uneventful            Sign Out: Acceptable CV status; No obvious hypovolemia; No obvious fluid overload   Other NRE: NONE   DID A NON-ROUTINE EVENT OCCUR? No           Last vitals:  Vitals:    11/26/22 2304 11/26/22 2308 11/26/22 2312   BP:   (!) 161/88   Pulse: 85  (!) 133   Resp:   20   SpO2: 100% 100% 100%       Electronically Signed By: Julieth Solorio CRNA, MARINA BRAXTON  November 26, 2022  11:38 PM

## 2022-12-12 ENCOUNTER — DOCUMENTATION ONLY (OUTPATIENT)
Dept: EMERGENCY MEDICINE | Facility: CLINIC | Age: 62
End: 2022-12-12

## 2022-12-12 DIAGNOSIS — S43.004A: Primary | ICD-10-CM

## 2024-01-28 ENCOUNTER — HEALTH MAINTENANCE LETTER (OUTPATIENT)
Age: 64
End: 2024-01-28

## 2025-02-01 ENCOUNTER — HEALTH MAINTENANCE LETTER (OUTPATIENT)
Age: 65
End: 2025-02-01

## 2025-05-10 ENCOUNTER — HEALTH MAINTENANCE LETTER (OUTPATIENT)
Age: 65
End: 2025-05-10